# Patient Record
Sex: FEMALE | Race: BLACK OR AFRICAN AMERICAN | NOT HISPANIC OR LATINO | Employment: FULL TIME | URBAN - METROPOLITAN AREA
[De-identification: names, ages, dates, MRNs, and addresses within clinical notes are randomized per-mention and may not be internally consistent; named-entity substitution may affect disease eponyms.]

---

## 2024-03-01 ENCOUNTER — TELEPHONE (OUTPATIENT)
Dept: SURGERY | Facility: CLINIC | Age: 49
End: 2024-03-01
Payer: COMMERCIAL

## 2024-03-01 NOTE — TELEPHONE ENCOUNTER
----- Message from Ji Blanton sent at 3/1/2024  9:27 AM EST -----  Patient is requesting for Dr. Daniel to remove the mesh from her previous hernia repair performed by him. Please contact.

## 2024-03-01 NOTE — TELEPHONE ENCOUNTER
Spoke with the patient she would like to have her mesh taken out from her surgery 5  years ago, I explained that she would have to see doctor first for a consult , she agreed and we made it for next week.  Lucy

## 2024-03-13 ENCOUNTER — OFFICE VISIT (OUTPATIENT)
Dept: SURGERY | Facility: CLINIC | Age: 49
End: 2024-03-13
Payer: COMMERCIAL

## 2024-03-13 VITALS — WEIGHT: 143 LBS | BODY MASS INDEX: 25.34 KG/M2 | HEIGHT: 63 IN

## 2024-03-13 DIAGNOSIS — R10.33 PERIUMBILICAL ABDOMINAL PAIN: Primary | ICD-10-CM

## 2024-03-13 PROCEDURE — 99213 OFFICE O/P EST LOW 20 MIN: CPT | Performed by: SURGERY

## 2024-03-13 ASSESSMENT — ENCOUNTER SYMPTOMS: DEPRESSION: 1

## 2024-03-13 ASSESSMENT — PAIN SCALES - GENERAL: PAINLEVEL: 6

## 2024-03-13 NOTE — PROGRESS NOTES
Subjective   Patient ID: Migel Magdaleno is a 48 y.o. female who presents for consult (Wants mesh out).  HPI  48-year-old female who is known to me having taken the surgery for repair ventral hernia just above the umbilicus.  The surgery was performed in August 2019.  Over the past 9 months or so she has had intermittent pain especially with exertion.  She feels that the hernia  may be coming back.  She comes with a friend after just having right carpel tunnel surgery this morning.        Review of Systems   patient denies any weight loss, fever, change in bowel habits, melena, hematochezia, coronary artery disease, diabetes, hypertension, TIA/CVA, bleeding, jaundice, pancreatitis, hepatitis.       PAST MEDICAL HISTORY   Diagnosis Date    Ankylosing spondylitis (HCC)      Depression      Dermoid cyst      Low back pain              PAST SURGICAL HISTORY   Procedure Laterality Date    F SALPINGO-OOPHORECTOMY              Objective     Physical Exam    She is lean.  Well-developed. In no distress  Alert and oriented x 3  Lungs are clear to auscultation bilaterally.  Cardiac exam is regular rhythm and rate.  Abdomen is soft nontender nondistended.  Well-healed 4 cm incision just superior to the umbilicus.  With cough and Valsalva suggest that she may have a small recurrence of her hernia  Neurologic exam is without focal deficit.    Assessment/Plan        Impression: Pain site of previous ventral hernia repair with mesh.  Exam equivocal for recurrence of her hernia.  Will get a CT scan to further evaluate her abdominal wall.  Gave her the requisition and have asked her to give me a call when she is gotten the study

## 2024-03-13 NOTE — PATIENT INSTRUCTIONS
To further evaluate the pain at the hernia site I did order CT scan.    Please call the number provided to schedule the studies    Give our office a call back when she is gotten a CT scan

## 2024-03-13 NOTE — LETTER
March 13, 2024     Rishabh Aranda MD  [address]  [facility] [address]    Patient: Migel Magdaleno   YOB: 1975   Date of Visit: 3/13/2024       Dear Dr. Rishabh Aranda MD:    Thank you for referring Migel Magdaleno to me for evaluation. Below are my notes for this consultation.  If you have questions, please do not hesitate to call me. I look forward to following your patient along with you.       Sincerely,     Alexis Daniel MD      CC: No Recipients  ______________________________________________________________________________________    Subjective  Patient ID: Migel Magdaleno is a 48 y.o. female who presents for consult (Wants mesh out).  HPI  48-year-old female who is known to me having taken the surgery for repair ventral hernia just above the umbilicus.  The surgery was performed in August 2019.  Over the past 9 months or so she has had intermittent pain especially with exertion.  She feels that the hernia  may be coming back.  She comes with a friend after just having right carpel tunnel surgery this morning.        Review of Systems   patient denies any weight loss, fever, change in bowel habits, melena, hematochezia, coronary artery disease, diabetes, hypertension, TIA/CVA, bleeding, jaundice, pancreatitis, hepatitis.       PAST MEDICAL HISTORY   Diagnosis Date   • Ankylosing spondylitis (HCC)     • Depression     • Dermoid cyst     • Low back pain              PAST SURGICAL HISTORY   Procedure Laterality Date   • F SALPINGO-OOPHORECTOMY              Objective    Physical Exam    She is lean.  Well-developed. In no distress  Alert and oriented x 3  Lungs are clear to auscultation bilaterally.  Cardiac exam is regular rhythm and rate.  Abdomen is soft nontender nondistended.  Well-healed 4 cm incision just superior to the umbilicus.  With cough and Valsalva suggest that she may have a small recurrence of her hernia  Neurologic exam is without focal deficit.    Assessment/Plan        Impression: Pain site of previous ventral hernia repair with mesh.  Exam equivocal for recurrence of her hernia.  Will get a CT scan to further evaluate her abdominal wall.  Gave her the requisition and have asked her to give me a call when she is gotten the study

## 2024-03-14 ENCOUNTER — APPOINTMENT (OUTPATIENT)
Dept: SURGERY | Facility: CLINIC | Age: 49
End: 2024-03-14